# Patient Record
Sex: FEMALE | HISPANIC OR LATINO | URBAN - METROPOLITAN AREA
[De-identification: names, ages, dates, MRNs, and addresses within clinical notes are randomized per-mention and may not be internally consistent; named-entity substitution may affect disease eponyms.]

---

## 2022-03-15 ENCOUNTER — OFFICE VISIT (OUTPATIENT)
Dept: URBAN - METROPOLITAN AREA CLINIC 121 | Facility: CLINIC | Age: 67
End: 2022-03-15

## 2022-04-22 ENCOUNTER — OFFICE VISIT (OUTPATIENT)
Dept: URBAN - METROPOLITAN AREA CLINIC 63 | Facility: CLINIC | Age: 67
End: 2022-04-22

## 2022-05-06 ENCOUNTER — OFFICE VISIT (OUTPATIENT)
Dept: URBAN - METROPOLITAN AREA CLINIC 121 | Facility: CLINIC | Age: 67
End: 2022-05-06

## 2022-05-06 ENCOUNTER — OFFICE VISIT (OUTPATIENT)
Dept: URBAN - METROPOLITAN AREA CLINIC 63 | Facility: CLINIC | Age: 67
End: 2022-05-06

## 2022-06-03 ENCOUNTER — OFFICE VISIT (OUTPATIENT)
Dept: URBAN - METROPOLITAN AREA SURGERY CENTER 4 | Facility: SURGERY CENTER | Age: 67
End: 2022-06-03

## 2022-06-17 ENCOUNTER — OFFICE VISIT (OUTPATIENT)
Dept: URBAN - METROPOLITAN AREA SURGERY CENTER 4 | Facility: SURGERY CENTER | Age: 67
End: 2022-06-17

## 2022-06-21 LAB
% SATURATION: (no result)
ABSOLUTE BASOPHILS: (no result)
ABSOLUTE EOSINOPHILS: (no result)
ABSOLUTE LYMPHOCYTES: (no result)
ABSOLUTE MONOCYTES: (no result)
ABSOLUTE NEUTROPHILS: (no result)
BASOPHILS: (no result)
EOSINOPHILS: (no result)
FERRITIN: (no result)
FOLATE, SERUM: (no result)
HEMATOCRIT: (no result)
HEMOGLOBIN: (no result)
IMMUNOGLOBULIN A: (no result)
INTERPRETATION: (no result)
IRON BINDING CAPACITY: (no result)
IRON, TOTAL: (no result)
LYMPHOCYTES: (no result)
MCH: (no result)
MCHC: (no result)
MCV: (no result)
MONOCYTES: (no result)
MPV: (no result)
NEUTROPHILS: (no result)
PLATELET COUNT: (no result)
RDW: (no result)
RED BLOOD CELL COUNT: (no result)
TISSUE TRANSGLUTAMINASE AB, IGA: (no result)
VITAMIN B12: (no result)
WHITE BLOOD CELL COUNT: (no result)

## 2022-07-01 ENCOUNTER — OFFICE VISIT (OUTPATIENT)
Dept: URBAN - METROPOLITAN AREA CLINIC 63 | Facility: CLINIC | Age: 67
End: 2022-07-01

## 2022-07-08 ENCOUNTER — OFFICE VISIT (OUTPATIENT)
Dept: URBAN - METROPOLITAN AREA CLINIC 63 | Facility: CLINIC | Age: 67
End: 2022-07-08
Payer: COMMERCIAL

## 2022-07-08 ENCOUNTER — WEB ENCOUNTER (OUTPATIENT)
Dept: URBAN - METROPOLITAN AREA CLINIC 63 | Facility: CLINIC | Age: 67
End: 2022-07-08

## 2022-07-08 VITALS
DIASTOLIC BLOOD PRESSURE: 74 MMHG | OXYGEN SATURATION: 99 % | BODY MASS INDEX: 28.09 KG/M2 | TEMPERATURE: 96.8 F | WEIGHT: 148.8 LBS | SYSTOLIC BLOOD PRESSURE: 121 MMHG | HEIGHT: 61 IN | HEART RATE: 78 BPM

## 2022-07-08 DIAGNOSIS — A04.8 HELICOBACTER PYLORI (H. PYLORI): ICD-10-CM

## 2022-07-08 DIAGNOSIS — D50.9 ANEMIA: ICD-10-CM

## 2022-07-08 DIAGNOSIS — D12.2 ADENOMA OF ASCENDING COLON: ICD-10-CM

## 2022-07-08 PROBLEM — 721730009: Status: ACTIVE | Noted: 2022-07-08

## 2022-07-08 PROBLEM — 87522002: Status: ACTIVE | Noted: 2022-07-08

## 2022-07-08 PROCEDURE — 99213 OFFICE O/P EST LOW 20 MIN: CPT | Performed by: PHYSICIAN ASSISTANT

## 2022-07-08 RX ORDER — PANTOPRAZOLE SODIUM 20 MG/1
1 TABLET TABLET, DELAYED RELEASE ORAL PRN
Status: ACTIVE | COMMUNITY

## 2022-07-08 RX ORDER — LISINOPRIL 2.5 MG/1
1 TABLET TABLET ORAL ONCE A DAY
Status: ACTIVE | COMMUNITY

## 2022-07-08 RX ORDER — ROSUVASTATIN CALCIUM 10 MG/1
1 TABLET TABLET, FILM COATED ORAL ONCE A DAY
Status: ACTIVE | COMMUNITY

## 2022-07-08 RX ORDER — PANTOPRAZOLE SODIUM 20 MG/1
1 TABLET TABLET, DELAYED RELEASE ORAL TWICE A DAY
Qty: 28 TABLET | Refills: 0 | OUTPATIENT
Start: 2022-07-08

## 2022-07-08 RX ORDER — CLARITHROMYCIN 500 MG/1
1 TABLET TABLET, FILM COATED ORAL
Qty: 28 TABLET | Refills: 0 | OUTPATIENT
Start: 2022-07-08 | End: 2022-07-22

## 2022-07-08 RX ORDER — AMOXICILLIN 500 MG/1
2 CAPSULES CAPSULE ORAL TWICE A DAY
Qty: 56 CAPSULE | Refills: 0 | OUTPATIENT
Start: 2022-07-08 | End: 2022-07-22

## 2022-07-08 NOTE — HPI-TODAY'S VISIT:
Shweta is a pleasant 66-year-old female who presents today for procedure follow up  EGD dated 6/17/2022 showed a regular Z-line.  Gastritis.  Normal duodenum.  A single 6 mm sessile polyp was biopsied in the first portion of the duodenum. Small bowel mucosa without histopathologic diagnosis.  Chronic superficial antral gastritis positive for H. pylori.  Benign lower third esophageal biopsies  Colonoscopy dated 6/17/2022 demonstrated fair preparation of the colon.  1 diminutive benign polyp removed from the proximal ascending colon.  Pandiverticulosis coli.  Nonbleeding internal hemorrhoids.  Repeat colonoscopy 1 year due to suboptimal prep.  Labs dated 6/13/2022 showed hemoglobin 10.7, hematocrit 34.1.  Normal ferritin.  Normal iron studies.  Normal vitamin B12/folate.  No serologic evidence of celiac disease  Labs dated 6/20/2022 showed normal LFTs.  Normal renal function.   Ultrasound dated 3/18/2022 showed cholelithiasis without evidence of acute cholecystitis    Labs dated 2/18/2022 showed a negative hepatitis panel.  Normal CMP.  TSH normal.  Hemoglobin 10.7, hematocrit 33.8.  MCV normal.    No issues after procedure. Notes 1-2 daily bowel movements without constipation or diarrhea. Chronic reflux well controlled on pantoprazole 20 mg qd. Scheduled to see surgeon for gallstones. Denies any nausea, vomiting, dysphagia, odynophagia, hematemesis, coffee ground emesis, abdominal pain, change in bowel habits, abnormal weight loss, BRBPR or melena. Denies any family history of colon cancer or colon polyps. Notes no other GI complaints at this time

## 2022-07-09 ENCOUNTER — TELEPHONE ENCOUNTER (OUTPATIENT)
Dept: URBAN - METROPOLITAN AREA CLINIC 121 | Facility: CLINIC | Age: 67
End: 2022-07-09

## 2022-07-10 ENCOUNTER — TELEPHONE ENCOUNTER (OUTPATIENT)
Dept: URBAN - METROPOLITAN AREA CLINIC 121 | Facility: CLINIC | Age: 67
End: 2022-07-10

## 2022-07-10 RX ORDER — ONDANSETRON HYDROCHLORIDE 4 MG/1
USE AS DIRECTED TAKE ONE TABLET 30 MINUTES BEFORE EACH BOTTLE OF MAGNESIUM CITRATE FOR COLONOSCOPY PREP TABLET, FILM COATED ORAL
Refills: 0 | Status: ACTIVE | COMMUNITY
Start: 2022-05-06

## 2022-07-10 RX ORDER — PANTOPRAZOLE 20 MG/1
TABLET, DELAYED RELEASE ORAL AS NEEDED
Refills: 0 | Status: ACTIVE | COMMUNITY
Start: 2022-05-06

## 2022-07-10 RX ORDER — ROSUVASTATIN CALCIUM 10 MG/1
TABLET, FILM COATED ORAL ONCE A DAY
Refills: 0 | Status: ACTIVE | COMMUNITY
Start: 2022-05-06

## 2022-07-10 RX ORDER — LISINOPRIL 2.5 MG/1
TABLET ORAL ONCE A DAY
Refills: 0 | Status: ACTIVE | COMMUNITY
Start: 2022-05-06

## 2022-08-12 ENCOUNTER — WEB ENCOUNTER (OUTPATIENT)
Dept: URBAN - METROPOLITAN AREA CLINIC 63 | Facility: CLINIC | Age: 67
End: 2022-08-12

## 2022-08-12 ENCOUNTER — OFFICE VISIT (OUTPATIENT)
Dept: URBAN - METROPOLITAN AREA CLINIC 63 | Facility: CLINIC | Age: 67
End: 2022-08-12
Payer: COMMERCIAL

## 2022-08-12 VITALS
DIASTOLIC BLOOD PRESSURE: 74 MMHG | BODY MASS INDEX: 27.49 KG/M2 | WEIGHT: 145.6 LBS | OXYGEN SATURATION: 99 % | SYSTOLIC BLOOD PRESSURE: 119 MMHG | HEIGHT: 61 IN | HEART RATE: 62 BPM | TEMPERATURE: 98.3 F

## 2022-08-12 DIAGNOSIS — D50.9 ANEMIA: ICD-10-CM

## 2022-08-12 DIAGNOSIS — A04.8 HELICOBACTER PYLORI (H. PYLORI): ICD-10-CM

## 2022-08-12 PROCEDURE — 99213 OFFICE O/P EST LOW 20 MIN: CPT | Performed by: PHYSICIAN ASSISTANT

## 2022-08-12 RX ORDER — LISINOPRIL 2.5 MG/1
1 TABLET TABLET ORAL ONCE A DAY
Status: ACTIVE | COMMUNITY

## 2022-08-12 RX ORDER — PANTOPRAZOLE SODIUM 20 MG/1
1 TABLET TABLET, DELAYED RELEASE ORAL PRN
Status: ACTIVE | COMMUNITY

## 2022-08-12 RX ORDER — ROSUVASTATIN CALCIUM 10 MG/1
1 TABLET TABLET, FILM COATED ORAL ONCE A DAY
Status: ACTIVE | COMMUNITY

## 2022-09-16 ENCOUNTER — LAB OUTSIDE AN ENCOUNTER (OUTPATIENT)
Dept: URBAN - METROPOLITAN AREA CLINIC 63 | Facility: CLINIC | Age: 67
End: 2022-09-16

## 2022-09-23 ENCOUNTER — OFFICE VISIT (OUTPATIENT)
Dept: URBAN - METROPOLITAN AREA CLINIC 63 | Facility: CLINIC | Age: 67
End: 2022-09-23
Payer: COMMERCIAL

## 2022-09-23 ENCOUNTER — LAB OUTSIDE AN ENCOUNTER (OUTPATIENT)
Dept: URBAN - METROPOLITAN AREA CLINIC 63 | Facility: CLINIC | Age: 67
End: 2022-09-23

## 2022-09-23 VITALS
SYSTOLIC BLOOD PRESSURE: 132 MMHG | HEART RATE: 57 BPM | WEIGHT: 143.8 LBS | OXYGEN SATURATION: 99 % | HEIGHT: 61 IN | TEMPERATURE: 97.1 F | DIASTOLIC BLOOD PRESSURE: 80 MMHG | BODY MASS INDEX: 27.15 KG/M2

## 2022-09-23 DIAGNOSIS — R63.4 ABNORMAL WEIGHT LOSS: ICD-10-CM

## 2022-09-23 DIAGNOSIS — D50.9 ANEMIA: ICD-10-CM

## 2022-09-23 PROCEDURE — 99213 OFFICE O/P EST LOW 20 MIN: CPT | Performed by: PHYSICIAN ASSISTANT

## 2022-09-23 RX ORDER — LISINOPRIL 2.5 MG/1
1 TABLET TABLET ORAL ONCE A DAY
Status: ACTIVE | COMMUNITY

## 2022-09-23 RX ORDER — ROSUVASTATIN CALCIUM 10 MG/1
1 TABLET TABLET, FILM COATED ORAL ONCE A DAY
Status: ACTIVE | COMMUNITY

## 2022-09-23 RX ORDER — PANTOPRAZOLE SODIUM 20 MG/1
1 TABLET TABLET, DELAYED RELEASE ORAL PRN
Status: ACTIVE | COMMUNITY

## 2022-09-23 NOTE — HPI-TODAY'S VISIT:
Shweta is a pleasant 67-year-old female who presents today for follow up. Successfully eradicated H. pylori.  Labs dated 9/16/2022 showed normal iron studies.  Vitamin B12 normal.  Ferritin normal.  H. pylori stool antigen negative.  RBC 3.58, hemoglobin 10.1, hematocrit 32.4.  Platelets normal.  EGD dated 6/17/2022 showed a regular Z-line.  Gastritis.  Normal duodenum.  A single 6 mm sessile polyp was biopsied in the first portion of the duodenum. Small bowel mucosa without histopathologic diagnosis.  Chronic superficial antral gastritis positive for H. pylori.  Benign lower third esophageal biopsies  Colonoscopy dated 6/17/2022 demonstrated fair preparation of the colon.  1 diminutive benign polyp removed from the proximal ascending colon.  Pandiverticulosis coli.  Nonbleeding internal hemorrhoids.  Repeat colonoscopy 1 year due to suboptimal prep.  Labs dated 6/13/2022 showed hemoglobin 10.7, hematocrit 34.1.  Normal ferritin.  Normal iron studies.  Normal vitamin B12/folate.  No serologic evidence of celiac disease  Labs dated 6/20/2022 showed normal LFTs.  Normal renal function.   Ultrasound dated 3/18/2022 showed cholelithiasis without evidence of acute cholecystitis    Labs dated 2/18/2022 showed a negative hepatitis panel.  Normal CMP.  TSH normal.  Hemoglobin 10.7, hematocrit 33.8.  MCV normal.    Notes 1-2 daily bowel movements without constipation or diarrhea. Scheduled to see surgeon for gallstones (keeps delaying initial consult). Denies any nausea, vomiting, heartburn, reflux, dysphagia, odynophagia, hematemesis, coffee ground emesis, abdominal pain, change in bowel habits, BRBPR or melena. Mentions recent weight loss of 5 pounds but she is very concerned as she has not been trying to lose weight. In fact she states she has been trying to gain weight. Denies any family history of colon cancer or colon polyps. Notes no other GI complaints at this time

## 2022-10-13 ENCOUNTER — TELEPHONE ENCOUNTER (OUTPATIENT)
Dept: URBAN - METROPOLITAN AREA CLINIC 63 | Facility: CLINIC | Age: 67
End: 2022-10-13

## 2022-10-14 LAB
FERRITIN, SERUM: 189
HELICOBACTER PYLORI AG, EIA, STOOL: (no result)
HEMATOCRIT: 32.4
HEMOGLOBIN: 10.1
IRON BIND.CAP.(TIBC): 274
IRON SATURATION: 35
IRON: 97
MCH: 28.2
MCHC: 31.2
MCV: 90.5
MPV: 11.7
PLATELET COUNT: 163
RDW: 11.6
RED BLOOD CELL COUNT: 3.58
VITAMIN B12: 368
WHITE BLOOD CELL COUNT: 4.5

## 2022-12-02 ENCOUNTER — DASHBOARD ENCOUNTERS (OUTPATIENT)
Age: 67
End: 2022-12-02

## 2022-12-02 ENCOUNTER — OFFICE VISIT (OUTPATIENT)
Dept: URBAN - METROPOLITAN AREA CLINIC 63 | Facility: CLINIC | Age: 67
End: 2022-12-02
Payer: COMMERCIAL

## 2022-12-02 ENCOUNTER — WEB ENCOUNTER (OUTPATIENT)
Dept: URBAN - METROPOLITAN AREA CLINIC 63 | Facility: CLINIC | Age: 67
End: 2022-12-02

## 2022-12-02 VITALS
WEIGHT: 147 LBS | HEIGHT: 61 IN | SYSTOLIC BLOOD PRESSURE: 110 MMHG | TEMPERATURE: 97.1 F | DIASTOLIC BLOOD PRESSURE: 60 MMHG | HEART RATE: 62 BPM | OXYGEN SATURATION: 98 % | BODY MASS INDEX: 27.75 KG/M2

## 2022-12-02 DIAGNOSIS — D50.9 ANEMIA: ICD-10-CM

## 2022-12-02 DIAGNOSIS — R63.4 ABNORMAL WEIGHT LOSS: ICD-10-CM

## 2022-12-02 PROBLEM — 271737000 ANEMIA: Status: ACTIVE | Noted: 2022-07-05

## 2022-12-02 PROCEDURE — 99213 OFFICE O/P EST LOW 20 MIN: CPT | Performed by: PHYSICIAN ASSISTANT

## 2022-12-02 RX ORDER — LISINOPRIL 2.5 MG/1
1 TABLET TABLET ORAL ONCE A DAY
Status: ACTIVE | COMMUNITY

## 2022-12-02 RX ORDER — FERROUS SULFATE 325(65) MG
1 TABLET TABLET ORAL ONCE A DAY
Status: ACTIVE | COMMUNITY

## 2022-12-02 RX ORDER — PANTOPRAZOLE SODIUM 20 MG/1
1 TABLET TABLET, DELAYED RELEASE ORAL PRN
Status: ACTIVE | COMMUNITY

## 2022-12-02 RX ORDER — ROSUVASTATIN CALCIUM 10 MG/1
1 TABLET TABLET, FILM COATED ORAL ONCE A DAY
Status: ACTIVE | COMMUNITY

## 2022-12-02 NOTE — HPI-TODAY'S VISIT:
Shweta is a pleasant 67-year-old female who presents today for follow up. Successfully eradicated H. pylori.  CT abdomen/pelvis with contrast dated 10/14/22 demonstrated no acute or suspicious finding in the abdomen or pelvis. Cholelithiasis. No additional findings to suggest acute cholecystitis or choledocholithiasis.  Labs dated 9/16/2022 showed normal iron studies.  Vitamin B12 normal.  Ferritin normal.  H. pylori stool antigen negative.  RBC 3.58, hemoglobin 10.1, hematocrit 32.4.  Platelets normal.  EGD dated 6/17/2022 showed a regular Z-line.  Gastritis.  Normal duodenum.  A single 6 mm sessile polyp was biopsied in the first portion of the duodenum. Small bowel mucosa without histopathologic diagnosis.  Chronic superficial antral gastritis positive for H. pylori.  Benign lower third esophageal biopsies  Colonoscopy dated 6/17/2022 demonstrated fair preparation of the colon.  1 diminutive benign polyp removed from the proximal ascending colon.  Pandiverticulosis coli.  Nonbleeding internal hemorrhoids.  Repeat colonoscopy 1 year due to suboptimal prep.  Labs dated 6/13/2022 showed hemoglobin 10.7, hematocrit 34.1.  Normal ferritin.  Normal iron studies.  Normal vitamin B12/folate.  No serologic evidence of celiac disease. Previously: normal LFTs.  Normal renal function and  negative hepatitis panel   Ultrasound dated 3/18/2022 showed cholelithiasis without evidence of acute cholecystitis    Notes 1-2 daily bowel movements without constipation or diarrhea. Scheduled to see surgeon for gallstones (keeps delaying initial consult). Scheduled to see hematology on 12/23/22. Denies any nausea, vomiting, heartburn, reflux, dysphagia, odynophagia, hematemesis, coffee ground emesis, abdominal pain, change in bowel habits, BRBPR or melena. Weight stable and gained a few pounds back. Denies any family history of colon cancer or colon polyps. Notes no other GI complaints at this time